# Patient Record
Sex: FEMALE | Race: WHITE | NOT HISPANIC OR LATINO | Employment: OTHER | URBAN - METROPOLITAN AREA
[De-identification: names, ages, dates, MRNs, and addresses within clinical notes are randomized per-mention and may not be internally consistent; named-entity substitution may affect disease eponyms.]

---

## 2017-01-04 ENCOUNTER — OFFICE VISIT (OUTPATIENT)
Dept: PULMONOLOGY | Facility: CLINIC | Age: 62
End: 2017-01-04

## 2017-01-04 VITALS
HEIGHT: 60 IN | OXYGEN SATURATION: 97 % | BODY MASS INDEX: 20.07 KG/M2 | HEART RATE: 68 BPM | SYSTOLIC BLOOD PRESSURE: 104 MMHG | WEIGHT: 102.2 LBS | RESPIRATION RATE: 16 BRPM | DIASTOLIC BLOOD PRESSURE: 60 MMHG | TEMPERATURE: 97.4 F

## 2017-01-04 DIAGNOSIS — I27.20 PULMONARY HYPERTENSION (HCC): ICD-10-CM

## 2017-01-04 DIAGNOSIS — R62.7 ADULT FAILURE TO THRIVE: ICD-10-CM

## 2017-01-04 DIAGNOSIS — J96.10 CHRONIC RESPIRATORY FAILURE, UNSPECIFIED WHETHER WITH HYPOXIA OR HYPERCAPNIA (HCC): ICD-10-CM

## 2017-01-04 DIAGNOSIS — R94.31 PROLONGED Q-T INTERVAL ON ECG: ICD-10-CM

## 2017-01-04 DIAGNOSIS — J43.2 CENTRILOBULAR EMPHYSEMA (HCC): ICD-10-CM

## 2017-01-04 DIAGNOSIS — I50.22 CHRONIC SYSTOLIC CONGESTIVE HEART FAILURE (HCC): ICD-10-CM

## 2017-01-04 DIAGNOSIS — R06.02 SOB (SHORTNESS OF BREATH): Primary | ICD-10-CM

## 2017-01-04 PROCEDURE — 99214 OFFICE O/P EST MOD 30 MIN: CPT | Performed by: NURSE PRACTITIONER

## 2017-01-04 NOTE — MR AVS SNAPSHOT
Tanika KULKARNI Spaulding   1/4/2017 12:30 PM   Office Visit    Dept Phone:  [phone]   Encounter #:  [id number]    Provider:  KEANU Minor   Department:  [facility]                Your Full Care Plan              Today's Medication Changes          These changes are accurate as of: 1/4/17 12:54 PM.  If you have any questions, ask your nurse or doctor.               Stop taking medication(s)listed here:     omeprazole 40 MG capsule   Commonly known as:  priLOSEC   Stopped by:  KEANU Minor           predniSONE 10 MG tablet   Commonly known as:  DELTASONE   Stopped by:  KEANU Minor                      Your Updated Medication List          This list is accurate as of: 1/4/17 12:54 PM.  Always use your most recent med list.                albuterol 108 (90 BASE) MCG/ACT inhaler   Commonly known as:  PROVENTIL HFA;VENTOLIN HFA       aspirin 81 MG tablet       atorvastatin 40 MG tablet   Commonly known as:  LIPITOR       digoxin 250 MCG tablet   Commonly known as:  LANOXIN   Take 1 tablet by mouth Daily.       ENTRESTO 24-26 MG tablet   Generic drug:  sacubitril-valsartan       FLOLAN 1.5 MG injection   Generic drug:  epoprostenol       furosemide 40 MG tablet   Commonly known as:  LASIX       gabapentin 100 MG capsule   Commonly known as:  NEURONTIN       LORazepam 0.5 MG tablet   Commonly known as:  ATIVAN       methadone 10 MG tablet   Commonly known as:  DOLOPHINE       mometasone-formoterol 200-5 MCG/ACT inhaler   Commonly known as:  DULERA 200       potassium chloride 20 MEQ CR tablet   Commonly known as:  K-DUR,KLOR-CON       sildenafil 20 MG tablet   Commonly known as:  REVATIO       tiotropium 18 MCG per inhalation capsule   Commonly known as:  SPIRIVA               We Performed the Following     XR Chest PA & Lateral       You Were Diagnosed With        Codes Comments    SOB (shortness of breath)    -  Primary ICD-10-CM:  "R06.02  ICD-9-CM: 786.05     Pulmonary hypertension     ICD-10-CM: I27.2  ICD-9-CM: 416.8     Prolonged Q-T interval on ECG     ICD-10-CM: R94.31  ICD-9-CM: 794.31     Chronic systolic congestive heart failure     ICD-10-CM: I50.22  ICD-9-CM: 428.22, 428.0     Chronic respiratory failure, unspecified whether with hypoxia or hypercapnia     ICD-10-CM: J96.10  ICD-9-CM: 518.83     Centrilobular emphysema     ICD-10-CM: J43.2  ICD-9-CM: 492.8     Adult failure to thrive     ICD-10-CM: R62.7  ICD-9-CM: 783.7       Instructions     None    Patient Instructions History      Upcoming Appointments     Visit Type Date Time Department    CHEST X-RAY 1/4/2017 12:00 PM [facility]    FOLLOW UP 1/4/2017 12:30 PM [facility]    XR CHEST PA AND LATERAL 1/4/2017 12:05 PM [facility]    FOLLOW UP 5/5/2017 10:00 AM [facility]      MyChart Signup     Our records indicate that you have declined [employer] signup. If you would like to sign up for [email or web address], please email [email or web address] or call [phone] to obtain an activation code.             Other Info from Your Visit           Your Appointments     May 05, 2017 10:00 AM EDT   Follow Up with KEANU Minor   [facility] (--)    Abelino Chambers. [address] [place] [id number] [phone]           Arrive 15 minutes prior to appointment.              Allergies     Sulfa Antibiotics  Nausea Only      Reason for Visit     Shortness of Breath           Vital Signs     Blood Pressure Pulse Temperature Respirations Height Weight    104/60 68 97.4 °F (36.3 °C) 16 60\" (152.4 cm) 102 lb 3.2 oz (46.4 kg)    Oxygen Saturation Body Mass Index Smoking Status             97% 19.96 kg/m2 Former Smoker         Problems and Diagnoses Noted     Respiratory failure    Adult failure to thrive    Chronic airway obstruction    Heart failure    Prolonged Q-T interval on ECG    Pulmonary hypertension "    SOB (shortness of breath)    -  Primary      Results     XR Chest PA & Lateral

## 2017-01-04 NOTE — PROGRESS NOTES
[facility], [place] Pulmonary Follow up    CHIEF COMPLAINT    Hospital follow-up    HISTORY OF PRESENT ILLNESS    Tanika Spaulding is a 61 y.o.female here today for she also follows up with Dr. Douglass at  for severe pulmonary hypertension.  She is on IV Flolan as well as Revatio.  She has chronic hypoxic respiratory failure and is on 8 L nasal cannula at home continuously.  She presented to [facility] on November 22 with acute on chronic respiratory failure.  She had was admitted with acute bronchitis and was placed on antibiotics and steroids.  She was also seen by palliative care while in the hospital for goals of care and comfort.  And at discharge was given a follow-up with palliative care as well for comfort measures and relief of her symptoms.    Her chest x-rays on admission shows advanced chronic changes but no acute findings.  She's completed a course of antibiotics and prednisone at this time.  She's actually feeling some better.  She still has a nonproductive cough.  She continues to use her nebulizers twice a day.  She is back to baseline, her dyspnea is at baseline.  She is able to name related house and able to get up and down the steps without difficulty.  She is monitoring oxygen saturations at home and they have been 90-99% on her 8 L.  She does wriggling turn her oxygen down to 6 L to concern for takes when she is out. she does have quite a bit of complaints of nasal dryness and irritation.  She does humidify her oxygen in her home.  She does not use any other nasal saline rinse or sprays.      She says she is eating well.  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  We did talk about the need to have adequate protein intake for her chronic illnesses.    She has a follow-up with Dr. Douglass in February.  She is tolerating her Flolan infusions well as well as her Revatio    Patient Active Problem List   Diagnosis   • Syncope   • Pulmonary  hypertension (On Revatio/Flolan - followed at [facility])   • Acute on Chronic hypoxic respiratory failure (Baseline 8L/Min NC)   • Hypokalemia   • CAD (coronary artery disease)   • Chronic pain   • Anemia   • Thrombocytopenia   • GERD (gastroesophageal reflux disease)   • Prolonged Q-T interval on ECG   • Dyslipidemia   • CHF (congestive heart failure)   • Centrilobular emphysema   • Adult failure to thrive   • Acute exacerbation of COPD with asthma   • Bronchitis       Allergies   Allergen Reactions   • Sulfa Antibiotics Nausea Only       Current Outpatient Prescriptions:   •  albuterol (PROVENTIL HFA;VENTOLIN HFA) 108 (90 BASE) MCG/ACT inhaler, Inhale 2 puffs every 6 (six) hours as needed for wheezing., Disp: , Rfl:   •  aspirin 81 MG tablet, Take 81 mg by mouth Daily., Disp: , Rfl:   •  atorvastatin (LIPITOR) 40 MG tablet, Take  by mouth., Disp: , Rfl:   •  digoxin (LANOXIN) 250 MCG tablet, Take 1 tablet by mouth Daily., Disp: 30 tablet, Rfl: 0  •  epoprostenol (FLOLAN) 1.5 MG injection, Infuse 3 mg into a venous catheter., Disp: , Rfl:   •  furosemide (LASIX) 40 MG tablet, Take 40 mg by mouth Daily. If pt has to take Lasix 80mg that day she takes her potassium, Disp: , Rfl:   •  gabapentin (NEURONTIN) 100 MG capsule, Take 100-200 mg by mouth Every Night., Disp: , Rfl:   •  LORazepam (ATIVAN) 0.5 MG tablet, Take 0.5 mg by mouth Every 8 (Eight) Hours As Needed for anxiety., Disp: , Rfl:   •  methadone (DOLOPHINE) 10 MG tablet, Take 10 mg by mouth Every 8 (Eight) Hours , , Disp: , Rfl:   •  mometasone-formoterol (DULERA 200) 200-5 MCG/ACT inhaler, Inhale 2 puffs 2 (two) times a day., Disp: , Rfl:   •  potassium chloride (K-DUR,KLOR-CON) 20 MEQ CR tablet, Take 20 mEq by mouth As Needed. Only takes when she takes Lasix 80mg , Disp: , Rfl:   •  sacubitril-valsartan (ENTRESTO) 24-26 MG tablet, Take 1 tablet by mouth 2 (Two) Times a Day., Disp: , Rfl:   •  sildenafil (REVATIO) 20 MG tablet, Take 40 mg by mouth 3 (Three)  "Times a Day., Disp: , Rfl:   •  tiotropium (SPIRIVA) 18 MCG per inhalation capsule, Place 1 capsule into inhaler and inhale 1 (one) time daily., Disp: , Rfl:   MEDICATION LIST AND ALLERGIES REVIEWED.    Social History   Substance Use Topics   • Smoking status: Former Smoker     Types: Cigarettes     Quit date: 06/2012   • Smokeless tobacco: Not on file   • Alcohol use Yes      Comment: occasional       FAMILY AND SOCIAL HISTORY REVIEWED.    Review of Systems   Constitutional: Positive for fatigue. Negative for chills, fever and unexpected weight change.   HENT: Negative for congestion, nosebleeds, postnasal drip, rhinorrhea, sinus pressure and trouble swallowing.    Respiratory: Positive for cough and shortness of breath. Negative for chest tightness and wheezing.    Cardiovascular: Negative for chest pain and leg swelling.   Gastrointestinal: Negative for abdominal pain, constipation, diarrhea, nausea and vomiting.   Genitourinary: Negative for dysuria, frequency, hematuria and urgency.   Musculoskeletal: Negative for myalgias.   Neurological: Negative for dizziness, weakness, numbness and headaches.   All other systems reviewed and are negative.  .    Visit Vitals   • /60   • Pulse 68   • Temp 97.4 °F (36.3 °C)   • Resp 16   • Ht 60\" (152.4 cm)   • Wt 102 lb 3.2 oz (46.4 kg)   • SpO2 97%  Comment: 8 L   • BMI 19.96 kg/m2     Physical Exam   Constitutional: She is oriented to person, place, and time. She appears well-developed and well-nourished.   Very thin   HENT:   Head: Normocephalic and atraumatic.   Eyes: EOM are normal. Pupils are equal, round, and reactive to light.   Neck: Normal range of motion. Neck supple.   Cardiovascular: Normal rate and regular rhythm.    No murmur heard.  Pulmonary/Chest: Effort normal. No respiratory distress. She has wheezes. She has rales.   Abdominal: Soft. Bowel sounds are normal. She exhibits no distension.   Mesh palpable through abd skin     Musculoskeletal: Normal " range of motion. She exhibits no edema.   Neurological: She is alert and oriented to person, place, and time.   Skin: Skin is warm and dry. No erythema.   Psychiatric: She has a normal mood and affect. Her behavior is normal.   Vitals reviewed.      RESULTS     Hospital results reviewed     Chest x-ray done in the office today show chronic changes, no acute process    PROBLEM LIST    Problem List Items Addressed This Visit        Cardiovascular and Mediastinum    Pulmonary hypertension (On Revatio/Flolan - followed at [facility])    Overview     Severe-on IV Flolan and Sildenafil.          Prolonged Q-T interval on ECG    CHF (congestive heart failure)       Respiratory    Acute on Chronic hypoxic respiratory failure (Baseline 8L/Min NC)    Centrilobular emphysema       Digestive    Adult failure to thrive      Other Visit Diagnoses     SOB (shortness of breath)    -  Primary    Relevant Orders    XR Chest PA & Lateral (Completed)            DISCUSSION     continue on oxygen at 2 L nasal cannula continuously, humidified at home for nasal irritation and dryness.  She can add some nasal saline or Vaseline to help with the discomfort and pain from the cannula.  We did discuss at this low there is some discomfort from the high flow.      Continue on her current inhaled medication regimen with bronchodilators twice daily.    Follow-up with Dr. Douglass in February for continued Flolan infusions.    Maliha Mills, KEANU  01/04/201712:53 PM  Electronically signed     Please note that portions of this note were completed with a voice recognition program. Efforts were made to edit the dictations, but occasionally words are mistranscribed.      CC: Hung Shields MD

## 2017-01-16 ENCOUNTER — DOCUMENTATION (OUTPATIENT)
Dept: PULMONOLOGY | Facility: CLINIC | Age: 62
End: 2017-01-16

## 2017-01-16 NOTE — PROGRESS NOTES
"Pt called wanting a refill on her Oxycodone/hydrocodone... I informed pt this is a narcotic and would have to be rx'd by a doctor. Pt was just seen on 01/04/2017 by Maliha, she states that she was told to call when she needed a refill. She asked if we had a Hetal in the office, I stated that she has never been seen by Chrystal Fong and that she is a NP and cannot rx narcotics. The medication pt was trying to get refilled was also not on her med list. Pt said she throw the bottle away and \"couldn't remember the name of it\". Pt as stated she has called two other facilities and was finally sent to us. I told pt that maybe she could contact the pharmacy and see what medication needs refills and who the prescriber is but as far as a narcotic I could not help her. Pt stated understanding.  "